# Patient Record
Sex: FEMALE | Race: WHITE | NOT HISPANIC OR LATINO | Employment: FULL TIME | URBAN - METROPOLITAN AREA
[De-identification: names, ages, dates, MRNs, and addresses within clinical notes are randomized per-mention and may not be internally consistent; named-entity substitution may affect disease eponyms.]

---

## 2023-09-23 PROBLEM — M20.12 HALLUX VALGUS WITH BUNIONS OF LEFT FOOT: Status: ACTIVE | Noted: 2023-09-23

## 2023-09-23 PROBLEM — R92.30 DENSE BREASTS: Status: ACTIVE | Noted: 2023-09-23

## 2023-09-23 PROBLEM — Z78.0 MENOPAUSE: Status: ACTIVE | Noted: 2023-09-23

## 2023-09-23 PROBLEM — M17.0 PRIMARY LOCALIZED OSTEOARTHRITIS OF BOTH KNEES: Status: ACTIVE | Noted: 2023-09-23

## 2023-09-23 PROBLEM — D22.9 MULTIPLE NEVI: Status: ACTIVE | Noted: 2023-09-23

## 2023-09-23 PROBLEM — R92.2 DENSE BREASTS: Status: ACTIVE | Noted: 2023-09-23

## 2023-09-23 PROBLEM — M25.551 HIP PAIN, BILATERAL: Status: ACTIVE | Noted: 2023-09-23

## 2023-09-23 PROBLEM — R00.1 SINUS BRADYCARDIA: Status: ACTIVE | Noted: 2023-09-23

## 2023-09-23 PROBLEM — N76.0 VAGINITIS: Status: ACTIVE | Noted: 2023-09-23

## 2023-09-23 PROBLEM — M17.11 PATELLOFEMORAL ARTHRITIS OF RIGHT KNEE: Status: ACTIVE | Noted: 2023-09-23

## 2023-09-23 PROBLEM — E55.9 VITAMIN D DEFICIENCY: Status: ACTIVE | Noted: 2023-09-23

## 2023-09-23 PROBLEM — A60.00 GENITAL HERPES: Status: ACTIVE | Noted: 2023-09-23

## 2023-09-23 PROBLEM — J40 TRACHEOBRONCHITIS: Status: ACTIVE | Noted: 2023-09-23

## 2023-09-23 PROBLEM — R10.13 EPIGASTRIC BURNING SENSATION: Status: ACTIVE | Noted: 2023-09-23

## 2023-09-23 PROBLEM — E78.89 ELEVATED HDL: Status: ACTIVE | Noted: 2023-09-23

## 2023-09-23 PROBLEM — R92.0 ABNORMAL MAMMOGRAM WITH MICROCALCIFICATION: Status: ACTIVE | Noted: 2023-09-23

## 2023-09-23 PROBLEM — M25.552 HIP PAIN, BILATERAL: Status: ACTIVE | Noted: 2023-09-23

## 2023-09-23 PROBLEM — R39.9 UTI SYMPTOMS: Status: ACTIVE | Noted: 2023-09-23

## 2023-09-23 PROBLEM — M21.612 HALLUX VALGUS WITH BUNIONS OF LEFT FOOT: Status: ACTIVE | Noted: 2023-09-23

## 2023-09-23 PROBLEM — N60.19 FIBROCYSTIC BREAST CHANGES: Status: ACTIVE | Noted: 2023-09-23

## 2023-09-23 PROBLEM — M85.80 OSTEOPENIA: Status: ACTIVE | Noted: 2023-09-23

## 2023-09-23 PROBLEM — M22.41 CHONDROMALACIA OF PATELLA, RIGHT: Status: ACTIVE | Noted: 2023-09-23

## 2023-09-23 PROBLEM — G89.29 CHRONIC PAIN OF RIGHT KNEE: Status: ACTIVE | Noted: 2023-09-23

## 2023-09-23 PROBLEM — E03.8 SUBCLINICAL HYPOTHYROIDISM: Status: ACTIVE | Noted: 2023-09-23

## 2023-09-23 PROBLEM — M25.561 CHRONIC PAIN OF RIGHT KNEE: Status: ACTIVE | Noted: 2023-09-23

## 2023-09-23 PROBLEM — K21.9 GASTROESOPHAGEAL REFLUX DISEASE: Status: ACTIVE | Noted: 2023-09-23

## 2023-09-23 PROBLEM — M25.50 ARTHRALGIA: Status: ACTIVE | Noted: 2023-09-23

## 2023-09-23 PROBLEM — M47.816 LUMBAR SPONDYLOSIS: Status: ACTIVE | Noted: 2023-09-23

## 2023-09-23 RX ORDER — CHOLECALCIFEROL (VITAMIN D3) 125 MCG
1 TABLET ORAL DAILY
COMMUNITY
Start: 2016-03-17 | End: 2023-12-06 | Stop reason: ALTCHOICE

## 2023-09-23 RX ORDER — PANTOPRAZOLE SODIUM 40 MG/1
1 TABLET, DELAYED RELEASE ORAL DAILY
COMMUNITY
Start: 2022-09-27 | End: 2023-12-06 | Stop reason: ALTCHOICE

## 2023-09-23 RX ORDER — RALOXIFENE HYDROCHLORIDE 60 MG/1
1 TABLET, FILM COATED ORAL DAILY
COMMUNITY
Start: 2022-01-21 | End: 2023-12-06 | Stop reason: ALTCHOICE

## 2023-09-23 RX ORDER — PANTOPRAZOLE SODIUM 20 MG/1
20 TABLET, DELAYED RELEASE ORAL
COMMUNITY
Start: 2023-01-25 | End: 2023-12-06 | Stop reason: ALTCHOICE

## 2023-09-23 RX ORDER — IBUPROFEN 200 MG
2 TABLET ORAL DAILY
COMMUNITY
Start: 2015-02-03

## 2023-10-31 ENCOUNTER — APPOINTMENT (OUTPATIENT)
Dept: ORTHOPEDIC SURGERY | Facility: CLINIC | Age: 63
End: 2023-10-31

## 2023-12-06 ENCOUNTER — OFFICE VISIT (OUTPATIENT)
Dept: OBSTETRICS AND GYNECOLOGY | Facility: CLINIC | Age: 63
End: 2023-12-06
Payer: COMMERCIAL

## 2023-12-06 VITALS
BODY MASS INDEX: 24.94 KG/M2 | HEIGHT: 60 IN | DIASTOLIC BLOOD PRESSURE: 60 MMHG | WEIGHT: 127 LBS | SYSTOLIC BLOOD PRESSURE: 118 MMHG

## 2023-12-06 DIAGNOSIS — Z12.39 SCREENING FOR BREAST CANCER USING NON-MAMMOGRAM MODALITY: Primary | ICD-10-CM

## 2023-12-06 DIAGNOSIS — F41.9 ANXIETY DUE TO INVASIVE PROCEDURE: ICD-10-CM

## 2023-12-06 DIAGNOSIS — Z01.419 WELL WOMAN EXAM: Primary | ICD-10-CM

## 2023-12-06 PROCEDURE — 99396 PREV VISIT EST AGE 40-64: CPT | Performed by: OBSTETRICS & GYNECOLOGY

## 2023-12-06 RX ORDER — LORAZEPAM 1 MG/1
1 TABLET ORAL ONCE
Status: DISCONTINUED | OUTPATIENT
Start: 2023-12-06 | End: 2024-01-27

## 2023-12-06 ASSESSMENT — ENCOUNTER SYMPTOMS
CARDIOVASCULAR NEGATIVE: 1
ENDOCRINE NEGATIVE: 1
EYES NEGATIVE: 1
HEMATOLOGIC/LYMPHATIC NEGATIVE: 1
MUSCULOSKELETAL NEGATIVE: 1
CONSTITUTIONAL NEGATIVE: 1
PSYCHIATRIC NEGATIVE: 1
RESPIRATORY NEGATIVE: 1
NEUROLOGICAL NEGATIVE: 1
GASTROINTESTINAL NEGATIVE: 1
ALLERGIC/IMMUNOLOGIC NEGATIVE: 1

## 2023-12-06 NOTE — ASSESSMENT & PLAN NOTE
Pap is due in 2026.  Discussed breast cancer screening and mammograms and MRI. Would like to try MRI again with something to help her relax.

## 2023-12-06 NOTE — PROGRESS NOTES
Subjective   Patient ID: Lashell Perkins is a 63 y.o. female who presents for Annual Exam (LLOYD//LAST PAP:  12/01/22- WNL -HPV/LAST MAMM:  02/2023//Chaperone Declined, Sharifa Solomon, MAII/).  Aching joints, especially her knees. May need replacement.  Exercise, yes.  Unsure about FAST MRI.        Review of Systems   Constitutional: Negative.    HENT: Negative.     Eyes: Negative.    Respiratory: Negative.     Cardiovascular: Negative.    Gastrointestinal: Negative.    Endocrine: Negative.    Genitourinary: Negative.    Musculoskeletal: Negative.    Skin: Negative.    Allergic/Immunologic: Negative.    Neurological: Negative.    Hematological: Negative.    Psychiatric/Behavioral: Negative.         Objective   Physical Exam  Constitutional:       Appearance: Normal appearance. She is normal weight.   HENT:      Head: Normocephalic.   Neck:      Thyroid: No thyroid mass or thyromegaly.   Pulmonary:      Effort: Pulmonary effort is normal.   Chest:   Breasts:     Right: Normal.      Left: Normal.   Abdominal:      Palpations: Abdomen is soft.   Genitourinary:     General: Normal vulva.      Exam position: Lithotomy position.      Cervix: Normal.      Uterus: Normal.       Adnexa: Right adnexa normal and left adnexa normal.   Musculoskeletal:         General: Normal range of motion.      Cervical back: Normal range of motion and neck supple.   Lymphadenopathy:      Upper Body:      Right upper body: No supraclavicular or axillary adenopathy.      Left upper body: No supraclavicular or axillary adenopathy.   Skin:     General: Skin is warm and dry.   Neurological:      General: No focal deficit present.      Mental Status: She is alert.   Psychiatric:         Mood and Affect: Mood normal.         Behavior: Behavior normal.         Thought Content: Thought content normal.         Judgment: Judgment normal.         Assessment/Plan   Problem List Items Addressed This Visit             ICD-10-CM       Genitourinary and  Reproductive    Well woman exam - Primary Z01.419     Pap is due in 2026.  Discussed breast cancer screening and mammograms and MRI. Would like to try MRI again with something to help her relax.                 Pari Medina MD 12/06/23 9:07 AM

## 2024-01-03 ENCOUNTER — TELEPHONE (OUTPATIENT)
Dept: PRIMARY CARE | Facility: CLINIC | Age: 64
End: 2024-01-03
Payer: COMMERCIAL

## 2024-01-03 DIAGNOSIS — M85.80 OSTEOPENIA, UNSPECIFIED LOCATION: ICD-10-CM

## 2024-01-03 DIAGNOSIS — R00.1 SINUS BRADYCARDIA: ICD-10-CM

## 2024-01-03 DIAGNOSIS — Z11.59 ENCOUNTER FOR HEPATITIS C SCREENING TEST FOR LOW RISK PATIENT: ICD-10-CM

## 2024-01-03 DIAGNOSIS — E03.8 SUBCLINICAL HYPOTHYROIDISM: Primary | ICD-10-CM

## 2024-01-03 DIAGNOSIS — K21.9 GASTROESOPHAGEAL REFLUX DISEASE WITHOUT ESOPHAGITIS: ICD-10-CM

## 2024-01-03 ASSESSMENT — PROMIS GLOBAL HEALTH SCALE
RATE_MENTAL_HEALTH: VERY GOOD
CARRYOUT_SOCIAL_ACTIVITIES: VERY GOOD
RATE_PHYSICAL_HEALTH: VERY GOOD
RATE_AVERAGE_PAIN: 5
CARRYOUT_PHYSICAL_ACTIVITIES: COMPLETELY
EMOTIONAL_PROBLEMS: RARELY
RATE_SOCIAL_SATISFACTION: VERY GOOD
RATE_QUALITY_OF_LIFE: VERY GOOD
RATE_AVERAGE_FATIGUE: MILD
RATE_GENERAL_HEALTH: VERY GOOD

## 2024-01-06 ENCOUNTER — LAB (OUTPATIENT)
Dept: LAB | Facility: LAB | Age: 64
End: 2024-01-06
Payer: COMMERCIAL

## 2024-01-06 DIAGNOSIS — K21.9 GASTROESOPHAGEAL REFLUX DISEASE WITHOUT ESOPHAGITIS: ICD-10-CM

## 2024-01-06 DIAGNOSIS — Z11.59 ENCOUNTER FOR HEPATITIS C SCREENING TEST FOR LOW RISK PATIENT: ICD-10-CM

## 2024-01-06 DIAGNOSIS — E03.8 SUBCLINICAL HYPOTHYROIDISM: ICD-10-CM

## 2024-01-06 LAB
ALBUMIN SERPL BCP-MCNC: 4.5 G/DL (ref 3.4–5)
ALT SERPL W P-5'-P-CCNC: 15 U/L (ref 7–45)
ANION GAP SERPL CALC-SCNC: 13 MMOL/L (ref 10–20)
APPEARANCE UR: CLEAR
AST SERPL W P-5'-P-CCNC: 16 U/L (ref 9–39)
BASOPHILS # BLD AUTO: 0.03 X10*3/UL (ref 0–0.1)
BASOPHILS NFR BLD AUTO: 0.6 %
BILIRUB UR STRIP.AUTO-MCNC: NEGATIVE MG/DL
BUN SERPL-MCNC: 20 MG/DL (ref 6–23)
CALCIUM SERPL-MCNC: 9.9 MG/DL (ref 8.6–10.6)
CHLORIDE SERPL-SCNC: 101 MMOL/L (ref 98–107)
CHOLEST SERPL-MCNC: 261 MG/DL (ref 0–199)
CHOLESTEROL/HDL RATIO: 3.2
CO2 SERPL-SCNC: 29 MMOL/L (ref 21–32)
COLOR UR: YELLOW
CREAT SERPL-MCNC: 0.84 MG/DL (ref 0.5–1.05)
EOSINOPHIL # BLD AUTO: 0.06 X10*3/UL (ref 0–0.7)
EOSINOPHIL NFR BLD AUTO: 1.2 %
ERYTHROCYTE [DISTWIDTH] IN BLOOD BY AUTOMATED COUNT: 12 % (ref 11.5–14.5)
GFR SERPL CREATININE-BSD FRML MDRD: 79 ML/MIN/1.73M*2
GLUCOSE SERPL-MCNC: 98 MG/DL (ref 74–99)
GLUCOSE UR STRIP.AUTO-MCNC: NEGATIVE MG/DL
HCT VFR BLD AUTO: 45.3 % (ref 36–46)
HCV AB SER QL: NONREACTIVE
HDLC SERPL-MCNC: 82.5 MG/DL
HGB BLD-MCNC: 14.7 G/DL (ref 12–16)
IMM GRANULOCYTES # BLD AUTO: 0.02 X10*3/UL (ref 0–0.7)
IMM GRANULOCYTES NFR BLD AUTO: 0.4 % (ref 0–0.9)
KETONES UR STRIP.AUTO-MCNC: NEGATIVE MG/DL
LDLC SERPL CALC-MCNC: 167 MG/DL
LEUKOCYTE ESTERASE UR QL STRIP.AUTO: NEGATIVE
LYMPHOCYTES # BLD AUTO: 1.44 X10*3/UL (ref 1.2–4.8)
LYMPHOCYTES NFR BLD AUTO: 28.6 %
MCH RBC QN AUTO: 31.2 PG (ref 26–34)
MCHC RBC AUTO-ENTMCNC: 32.5 G/DL (ref 32–36)
MCV RBC AUTO: 96 FL (ref 80–100)
MONOCYTES # BLD AUTO: 0.33 X10*3/UL (ref 0.1–1)
MONOCYTES NFR BLD AUTO: 6.6 %
NEUTROPHILS # BLD AUTO: 3.15 X10*3/UL (ref 1.2–7.7)
NEUTROPHILS NFR BLD AUTO: 62.6 %
NITRITE UR QL STRIP.AUTO: NEGATIVE
NON HDL CHOLESTEROL: 179 MG/DL (ref 0–149)
NRBC BLD-RTO: 0 /100 WBCS (ref 0–0)
PH UR STRIP.AUTO: 7 [PH]
PHOSPHATE SERPL-MCNC: 4.4 MG/DL (ref 2.5–4.9)
PLATELET # BLD AUTO: 215 X10*3/UL (ref 150–450)
POTASSIUM SERPL-SCNC: 4.4 MMOL/L (ref 3.5–5.3)
PROT UR STRIP.AUTO-MCNC: NEGATIVE MG/DL
RBC # BLD AUTO: 4.71 X10*6/UL (ref 4–5.2)
RBC # UR STRIP.AUTO: NEGATIVE /UL
SODIUM SERPL-SCNC: 139 MMOL/L (ref 136–145)
SP GR UR STRIP.AUTO: 1.01
TRIGL SERPL-MCNC: 60 MG/DL (ref 0–149)
TSH SERPL-ACNC: 2.61 MIU/L (ref 0.44–3.98)
UROBILINOGEN UR STRIP.AUTO-MCNC: <2 MG/DL
VLDL: 12 MG/DL (ref 0–40)
WBC # BLD AUTO: 5 X10*3/UL (ref 4.4–11.3)

## 2024-01-06 PROCEDURE — 80061 LIPID PANEL: CPT

## 2024-01-06 PROCEDURE — 81003 URINALYSIS AUTO W/O SCOPE: CPT

## 2024-01-06 PROCEDURE — 84450 TRANSFERASE (AST) (SGOT): CPT

## 2024-01-06 PROCEDURE — 80069 RENAL FUNCTION PANEL: CPT

## 2024-01-06 PROCEDURE — 85025 COMPLETE CBC W/AUTO DIFF WBC: CPT

## 2024-01-06 PROCEDURE — 84443 ASSAY THYROID STIM HORMONE: CPT

## 2024-01-06 PROCEDURE — 84460 ALANINE AMINO (ALT) (SGPT): CPT

## 2024-01-06 PROCEDURE — 86803 HEPATITIS C AB TEST: CPT

## 2024-01-06 PROCEDURE — 36415 COLL VENOUS BLD VENIPUNCTURE: CPT

## 2024-01-10 ENCOUNTER — OFFICE VISIT (OUTPATIENT)
Dept: PRIMARY CARE | Facility: CLINIC | Age: 64
End: 2024-01-10
Payer: COMMERCIAL

## 2024-01-10 VITALS
HEART RATE: 60 BPM | RESPIRATION RATE: 14 BRPM | WEIGHT: 127 LBS | HEIGHT: 60 IN | BODY MASS INDEX: 24.94 KG/M2 | SYSTOLIC BLOOD PRESSURE: 120 MMHG | DIASTOLIC BLOOD PRESSURE: 70 MMHG

## 2024-01-10 DIAGNOSIS — R00.1 SINUS BRADYCARDIA: ICD-10-CM

## 2024-01-10 DIAGNOSIS — E78.5 HYPERLIPIDEMIA, UNSPECIFIED HYPERLIPIDEMIA TYPE: ICD-10-CM

## 2024-01-10 DIAGNOSIS — E78.89 ELEVATED HDL: ICD-10-CM

## 2024-01-10 DIAGNOSIS — Z12.11 COLON CANCER SCREENING: ICD-10-CM

## 2024-01-10 DIAGNOSIS — J31.0 RHINITIS, UNSPECIFIED TYPE: ICD-10-CM

## 2024-01-10 DIAGNOSIS — M17.11 PATELLOFEMORAL ARTHRITIS OF RIGHT KNEE: ICD-10-CM

## 2024-01-10 DIAGNOSIS — Z00.00 ROUTINE GENERAL MEDICAL EXAMINATION AT A HEALTH CARE FACILITY: Primary | ICD-10-CM

## 2024-01-10 PROCEDURE — 99214 OFFICE O/P EST MOD 30 MIN: CPT | Performed by: INTERNAL MEDICINE

## 2024-01-10 PROCEDURE — 93000 ELECTROCARDIOGRAM COMPLETE: CPT | Performed by: INTERNAL MEDICINE

## 2024-01-10 PROCEDURE — 1036F TOBACCO NON-USER: CPT | Performed by: INTERNAL MEDICINE

## 2024-01-10 PROCEDURE — 90471 IMMUNIZATION ADMIN: CPT | Performed by: INTERNAL MEDICINE

## 2024-01-10 PROCEDURE — 99396 PREV VISIT EST AGE 40-64: CPT | Performed by: INTERNAL MEDICINE

## 2024-01-10 PROCEDURE — 90715 TDAP VACCINE 7 YRS/> IM: CPT | Performed by: INTERNAL MEDICINE

## 2024-01-10 RX ORDER — TRIAMCINOLONE ACETONIDE 55 UG/1
2 SPRAY, METERED NASAL DAILY
Qty: 16.5 G | Refills: 11 | Status: SHIPPED | OUTPATIENT
Start: 2024-01-10 | End: 2025-01-09

## 2024-01-10 NOTE — PATIENT INSTRUCTIONS
Overall you are in good health today  You have no clinical evidence of heart disease, and cardiac risk factors are satisfactory with emphasis on healthy good HDL cholesterol  Recommend repeat coronary artery calcium CT score to assess for any change since 2018  Age and gender appropriate cancer screening and prevention will be up-to-date with a colon check  Recommend Cologuard  Continue breast surveillance  Immunizations updated with tetanus diphtheria pertussis, good for 10 years; consider RSV vaccine  Rhinitis, nocturnal runny nose-May continue antihistamine for short-term relief, trial topical cortisone nasal spray for 2 to 3 weeks such as Nasacort and assess for clearing

## 2024-01-27 PROBLEM — Z00.00 ROUTINE GENERAL MEDICAL EXAMINATION AT A HEALTH CARE FACILITY: Status: ACTIVE | Noted: 2024-01-27

## 2024-01-27 PROBLEM — A60.00 GENITAL HERPES: Status: RESOLVED | Noted: 2023-09-23 | Resolved: 2024-01-27

## 2024-01-27 PROBLEM — Z12.11 COLON CANCER SCREENING: Status: ACTIVE | Noted: 2024-01-27

## 2024-01-27 PROBLEM — J31.0 RHINITIS: Status: ACTIVE | Noted: 2024-01-27

## 2024-01-27 PROBLEM — E78.5 HYPERLIPIDEMIA: Status: ACTIVE | Noted: 2024-01-27

## 2024-01-27 NOTE — PROGRESS NOTES
Subjective   Patient ID: Lashell Perkins is a 63 y.o. female who presents for Annual Exam.  HPI  Chronic postnasal drip, Zyrtec, nonseasonal  Knee pain brace Advil exercise worse on stairs, hands ache  Had flu and COVID-19 vaccines  Review of Systems  Constitutional: 122 #, but not feeling poorly, not feeling tired, no fever, no recent weight gain and no recent weight loss.   Eyes: exam UTD  glasses+ /contacts - 2021, but no blurred vision, no diplopia and no eyesight problems.   ENT: dental UTD+, but no hearing loss, no tinnitus, no earache, no sore throat, no hoarseness and no swollen glands in the neck.   Cardiovascular: no chest pain, no tightness or heavy pressure, no shortness of breath, no palpitations and no lower extremity edema.   Respiratory: no cough, not coughing up sputum and no wheezing that is consistent with asthma.   Gastrointestinal: colonoscope Dec 2014, but no change in bowel habits, no diarrhea, no constipation, no bloody stools, no nausea, no vomiting, no abdominal pain, no signs and symptoms of ulcer disease, no kingsley colored stools and no intolerance to fatty foods.   Genitourinary: no urinary frequency, no dysuria, no burning sensation during urination and no hematuria.   Musculoskeletal: arthralgias, joint stiffness and knees not limiting, but as noted in HPI, no myalgias, no joint swelling, no muscle weakness, no back pain, no limb pain, no limb swelling and no difficulty walking.   Skin: no rashes, no change in skin color and pigmentation, no skin lesions and no skin lumps.   Neurological: no headaches, no dizziness, no seizures, no tingling, no numbness, no signs and symptoms of stroke and no limb weakness.   Psychiatric: no confusion, no memory lapses or loss, no depression, no anxiety and no sleep disturbances.   Endocrine: no goiter, no thyroid disorder, no diabetes mellitus, no excessive thirst, no dry skin, no cold intolerance, no heat intolerance and no increased urinary frequency.    Hematologic/Lymphatic: is not slow to heal, does not bleed easily, does not bruise easily, no thrombophlebitis, no anemia and no history of blood transfusion.      Objective   Physical Exam  /70   Pulse 60   Resp 14   Ht 1.524 m (5')   Wt 57.6 kg (127 lb)   BMI 24.80 kg/m²     General: well-developed, well-nourished middle-aged ambulatory white female in no acute distress  Head: normocephalic/atraumatic; temporal arteries intact, nontender; Temporalmandibular joints nontender without click  Eyes: EOMI, PERRLA, sclera white, conjunctiva pink, fundi not examined  Ears: Canals cerumen, TMs clear intact with normal landmarks, hearing intact to speech and finger rub  Nose: Nasal mucosa clear, pink, moist; no sinus tenderness  Oropharynx: Mucosa clear, pink, moist without lesions or exudate   Dentition intact good/ fair condition/repair   Tonsils atrophy/absent   Tongue midline, normal mucosa and protrusion   Normal motion of palate/uvula/pharynx  Neck: Supple, full range of motion; no lymphadenopathy; no JVD   Thyroid normal size, smooth to palpation/observation without nodules   Carotids normal pulse, upstroke without bruit  Back: Spine normal shape, curvature, flexibility, nontender   No CVA or SIJ tenderness  Chest: Normal symmetrical, full expansion with equal breath sounds without adventitious sounds; wall nontender  Breasts: Not examined  Cor: PMI normal; regular rate and rhythm; normal S1, S2 without adventitious sounds  Abdomen: Soft, nontender, benign without mass, hepatosplenomegaly, fluid   Bowels sounds present, normal  Pelvic: Not examined  Rectal: Not examined  Extremities: Bones, Joints intact with FROM, T0 L0 S0; mild crepitus bilateral knees right greater than left   Pulses intact, symmetrical; no edema; venous system legs normal  Neurological: Mental status - alert, appropriate affect, normal orientation and conversational interaction; RIGHT handed dominant   Cranial Nerves: II-XII  intact   Motor - 5/5 strength throughout muscle groups; normal tone, bulk   Sensory - intact symmetrical soft touch, sharp, sense   DTRs - intact knee, ankle, brachial   Cerebellar - normal finger-nose; absent Rhomberg sign   Station/gait - intact, stable including toe, heel, and tandem   Integument: Skin - clear without rash or lesion   Lymph - without cervical, axillary, or inguinal lymphadenopathy       Falls -    Results/Data  Orthopedics consult 4/4/2023  GYN consult 12/6/2023  Ophthalmology consult 1/8/2024    Laboratory  1/6/24  HDL c 82, R 3.2 LDLC 167  February 2018 satisfactory with healthy vitamin D and normal TSH  Electrocardiogram sinus bradycardia rate 56  Esophagram 2/6/23 -normal  colonoscopy January 2014  Mammogram January 2021 2/13/23  Knee x-ray March 2018 4/4/23  Bone densitometry June 2016 2021 stable, near normal  Chest x-ray May 2018  CT cardiac score July 2018 equals 46     Assessment/Plan   Overall you are in good health today  You have no clinical evidence of heart disease, and cardiac risk factors are satisfactory with emphasis on healthy good HDL cholesterol  Recommend repeat coronary artery calcium CT score to assess for any change since 2018  Age and gender appropriate cancer screening and prevention will be up-to-date with a colon check  Recommend Cologuard  Continue breast surveillance  Immunizations updated with tetanus diphtheria pertussis, good for 10 years; consider RSV vaccine  Rhinitis, nocturnal runny nose-May continue antihistamine for short-term relief, trial topical cortisone nasal spray for 2 to 3 weeks such as Nasacort and assess for clearing  Problem List Items Addressed This Visit       Elevated HDL    Relevant Orders    ECG 12 lead (Clinic Performed) (Completed)    Patellofemoral arthritis of right knee    Sinus bradycardia    Relevant Orders    ECG 12 lead (Clinic Performed) (Completed)    Routine general medical examination at a health care facility - Primary     Colon cancer screening    Relevant Orders    Cologuard® colon cancer screening    Rhinitis    Relevant Medications    triamcinolone (Nasacort) 55 mcg nasal inhaler    Hyperlipidemia    Relevant Orders    ECG 12 lead (Clinic Performed) (Completed)    CT cardiac scoring wo IV contrast

## 2024-01-30 LAB — NONINV COLON CA DNA+OCC BLD SCRN STL QL: NEGATIVE

## 2024-02-13 PROBLEM — R92.30 DENSE BREAST TISSUE: Status: ACTIVE | Noted: 2024-02-13

## 2024-02-13 PROBLEM — Z13.71 BRCA NEGATIVE: Status: ACTIVE | Noted: 2024-02-13

## 2024-02-13 PROBLEM — Z12.39 BREAST CANCER SCREENING, HIGH RISK PATIENT: Status: ACTIVE | Noted: 2024-02-13
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
## 2024-02-15 ENCOUNTER — HOSPITAL ENCOUNTER (OUTPATIENT)
Dept: RADIOLOGY | Facility: HOSPITAL | Age: 64
Discharge: HOME | End: 2024-02-15
Payer: COMMERCIAL

## 2024-02-15 ENCOUNTER — OFFICE VISIT (OUTPATIENT)
Dept: SURGICAL ONCOLOGY | Facility: HOSPITAL | Age: 64
End: 2024-02-15
Payer: COMMERCIAL

## 2024-02-15 VITALS
TEMPERATURE: 97 F | WEIGHT: 127.4 LBS | SYSTOLIC BLOOD PRESSURE: 126 MMHG | DIASTOLIC BLOOD PRESSURE: 80 MMHG | BODY MASS INDEX: 24.88 KG/M2 | HEART RATE: 53 BPM

## 2024-02-15 DIAGNOSIS — Z12.31 SCREENING MAMMOGRAM FOR BREAST CANCER: ICD-10-CM

## 2024-02-15 DIAGNOSIS — Z13.71 BRCA NEGATIVE: ICD-10-CM

## 2024-02-15 DIAGNOSIS — Z12.39 BREAST CANCER SCREENING, HIGH RISK PATIENT: Primary | ICD-10-CM

## 2024-02-15 DIAGNOSIS — R92.30 DENSE BREAST TISSUE: ICD-10-CM

## 2024-02-15 DIAGNOSIS — Z12.31 BREAST CANCER SCREENING BY MAMMOGRAM: ICD-10-CM

## 2024-02-15 PROCEDURE — 99214 OFFICE O/P EST MOD 30 MIN: CPT | Performed by: NURSE PRACTITIONER

## 2024-02-15 PROCEDURE — 1036F TOBACCO NON-USER: CPT | Performed by: NURSE PRACTITIONER

## 2024-02-15 PROCEDURE — 77067 SCR MAMMO BI INCL CAD: CPT

## 2024-02-15 PROCEDURE — 77063 BREAST TOMOSYNTHESIS BI: CPT | Mod: BILATERAL PROCEDURE | Performed by: RADIOLOGY

## 2024-02-15 PROCEDURE — 77067 SCR MAMMO BI INCL CAD: CPT | Mod: BILATERAL PROCEDURE | Performed by: RADIOLOGY

## 2024-02-15 ASSESSMENT — PAIN SCALES - GENERAL: PAINLEVEL: 0-NO PAIN

## 2024-03-19 DIAGNOSIS — M25.551 ACUTE HIP PAIN, RIGHT: Primary | ICD-10-CM

## 2024-03-19 DIAGNOSIS — M25.551 RIGHT HIP PAIN: ICD-10-CM

## 2024-03-21 ENCOUNTER — HOSPITAL ENCOUNTER (OUTPATIENT)
Dept: RADIOLOGY | Facility: CLINIC | Age: 64
Discharge: HOME | End: 2024-03-21
Payer: COMMERCIAL

## 2024-03-21 DIAGNOSIS — M25.551 RIGHT HIP PAIN: ICD-10-CM

## 2024-03-21 PROCEDURE — 73502 X-RAY EXAM HIP UNI 2-3 VIEWS: CPT | Mod: RIGHT SIDE | Performed by: RADIOLOGY

## 2024-03-21 PROCEDURE — 73502 X-RAY EXAM HIP UNI 2-3 VIEWS: CPT | Mod: RT

## 2024-03-22 ENCOUNTER — OFFICE VISIT (OUTPATIENT)
Dept: ORTHOPEDIC SURGERY | Facility: CLINIC | Age: 64
End: 2024-03-22
Payer: COMMERCIAL

## 2024-03-22 ENCOUNTER — APPOINTMENT (OUTPATIENT)
Dept: RADIOLOGY | Facility: CLINIC | Age: 64
End: 2024-03-22
Payer: COMMERCIAL

## 2024-03-22 VITALS — WEIGHT: 127 LBS | HEIGHT: 60 IN | BODY MASS INDEX: 24.94 KG/M2

## 2024-03-22 DIAGNOSIS — M16.11 ARTHRITIS OF RIGHT HIP: Primary | ICD-10-CM

## 2024-03-22 DIAGNOSIS — M25.551 RIGHT HIP PAIN: ICD-10-CM

## 2024-03-22 PROCEDURE — 1036F TOBACCO NON-USER: CPT | Performed by: PHYSICIAN ASSISTANT

## 2024-03-22 PROCEDURE — 99204 OFFICE O/P NEW MOD 45 MIN: CPT | Performed by: PHYSICIAN ASSISTANT

## 2024-03-22 RX ORDER — MELOXICAM 15 MG/1
15 TABLET ORAL DAILY
Qty: 30 TABLET | Refills: 11 | Status: SHIPPED | OUTPATIENT
Start: 2024-03-22 | End: 2025-03-22
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
## 2024-03-26 ENCOUNTER — HOSPITAL ENCOUNTER (OUTPATIENT)
Dept: RADIOLOGY | Facility: HOSPITAL | Age: 64
Discharge: HOME | End: 2024-03-26
Payer: COMMERCIAL

## 2024-03-26 DIAGNOSIS — M25.551 RIGHT HIP PAIN: ICD-10-CM

## 2024-03-26 PROCEDURE — 77002 NEEDLE LOCALIZATION BY XRAY: CPT | Mod: RIGHT SIDE | Performed by: INTERNAL MEDICINE

## 2024-03-26 PROCEDURE — 2500000004 HC RX 250 GENERAL PHARMACY W/ HCPCS (ALT 636 FOR OP/ED): Performed by: PHYSICIAN ASSISTANT

## 2024-03-26 PROCEDURE — 20610 DRAIN/INJ JOINT/BURSA W/O US: CPT | Mod: RIGHT SIDE | Performed by: INTERNAL MEDICINE

## 2024-03-26 PROCEDURE — 77002 NEEDLE LOCALIZATION BY XRAY: CPT | Mod: RT

## 2024-03-26 PROCEDURE — 2550000001 HC RX 255 CONTRASTS: Performed by: PHYSICIAN ASSISTANT

## 2024-03-26 RX ORDER — BUPIVACAINE HCL/EPINEPHRINE 0.5-1:200K
3 VIAL (ML) INJECTION ONCE
Status: DISCONTINUED | OUTPATIENT
Start: 2024-03-26 | End: 2024-03-27 | Stop reason: HOSPADM

## 2024-03-26 RX ORDER — TRIAMCINOLONE ACETONIDE 40 MG/ML
1 INJECTION, SUSPENSION INTRA-ARTICULAR; INTRAMUSCULAR ONCE
Status: COMPLETED | OUTPATIENT
Start: 2024-03-26 | End: 2024-03-26

## 2024-03-26 RX ORDER — LIDOCAINE HYDROCHLORIDE 10 MG/ML
5 INJECTION, SOLUTION EPIDURAL; INFILTRATION; INTRACAUDAL; PERINEURAL ONCE
Status: DISCONTINUED | OUTPATIENT
Start: 2024-03-26 | End: 2024-03-27 | Stop reason: HOSPADM

## 2024-03-26 RX ADMIN — TRIAMCINOLONE ACETONIDE 1 MG: 40 INJECTION, SUSPENSION INTRA-ARTICULAR; INTRAMUSCULAR at 13:45

## 2024-03-26 RX ADMIN — IOHEXOL 3 ML: 240 INJECTION, SOLUTION INTRATHECAL; INTRAVASCULAR; INTRAVENOUS; ORAL at 13:25

## 2024-03-26 NOTE — POST-PROCEDURE NOTE
Interventional Radiology Brief Postprocedure Note    Attending: Sergio Villanueva MD    Assistant: Francis Blaes MD    Diagnosis: Right hip pain    Description of procedure:   Technically successful fluoroscopic guided right hip joint injection of bupivacaine and Kenalog was performed. Please see PACS report for full procedural details.     Anesthesia:  Local    Complications: None    Estimated Blood Loss: minimal    Medications (Filter: Administrations occurring from 1413 to 1413 on 03/26/24) As of 03/26/24 1413      None          No specimens collected      See detailed result report with images in PACS.    The patient tolerated the procedure well without incident or complication and is in stable condition.      Shawn, Brittany

## 2024-04-02 ENCOUNTER — APPOINTMENT (OUTPATIENT)
Dept: ORTHOPEDIC SURGERY | Facility: CLINIC | Age: 64
End: 2024-04-02
Payer: COMMERCIAL

## 2024-04-09 ENCOUNTER — APPOINTMENT (OUTPATIENT)
Dept: ORTHOPEDIC SURGERY | Facility: CLINIC | Age: 64
End: 2024-04-09
Payer: COMMERCIAL

## 2024-04-30 ENCOUNTER — HOSPITAL ENCOUNTER (OUTPATIENT)
Dept: RADIOLOGY | Facility: HOSPITAL | Age: 64
Discharge: HOME | End: 2024-04-30
Payer: COMMERCIAL

## 2024-04-30 DIAGNOSIS — E78.5 HYPERLIPIDEMIA, UNSPECIFIED HYPERLIPIDEMIA TYPE: ICD-10-CM

## 2024-04-30 PROCEDURE — 75571 CT HRT W/O DYE W/CA TEST: CPT

## 2024-05-01 ENCOUNTER — HOSPITAL ENCOUNTER (OUTPATIENT)
Dept: RADIOLOGY | Facility: CLINIC | Age: 64
Discharge: HOME | End: 2024-05-01
Payer: COMMERCIAL

## 2024-05-01 ENCOUNTER — OFFICE VISIT (OUTPATIENT)
Dept: ORTHOPEDIC SURGERY | Facility: CLINIC | Age: 64
End: 2024-05-01
Payer: COMMERCIAL

## 2024-05-01 DIAGNOSIS — M15.9 PRIMARY OSTEOARTHRITIS INVOLVING MULTIPLE JOINTS: ICD-10-CM

## 2024-05-01 PROCEDURE — 99214 OFFICE O/P EST MOD 30 MIN: CPT | Performed by: STUDENT IN AN ORGANIZED HEALTH CARE EDUCATION/TRAINING PROGRAM

## 2024-05-01 PROCEDURE — 73562 X-RAY EXAM OF KNEE 3: CPT | Mod: RIGHT SIDE | Performed by: RADIOLOGY

## 2024-05-01 PROCEDURE — 73560 X-RAY EXAM OF KNEE 1 OR 2: CPT | Mod: RIGHT SIDE | Performed by: RADIOLOGY

## 2024-05-01 PROCEDURE — 73560 X-RAY EXAM OF KNEE 1 OR 2: CPT | Mod: LT

## 2024-05-01 PROCEDURE — 1036F TOBACCO NON-USER: CPT | Performed by: STUDENT IN AN ORGANIZED HEALTH CARE EDUCATION/TRAINING PROGRAM

## 2024-05-01 PROCEDURE — 73562 X-RAY EXAM OF KNEE 3: CPT | Mod: RT
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
## 2024-08-12 ENCOUNTER — PATIENT MESSAGE (OUTPATIENT)
Dept: PRIMARY CARE | Facility: CLINIC | Age: 64
End: 2024-08-12

## 2024-12-12 ENCOUNTER — APPOINTMENT (OUTPATIENT)
Dept: OBSTETRICS AND GYNECOLOGY | Facility: CLINIC | Age: 64
End: 2024-12-12
Payer: COMMERCIAL

## 2024-12-12 VITALS
DIASTOLIC BLOOD PRESSURE: 64 MMHG | SYSTOLIC BLOOD PRESSURE: 132 MMHG | HEIGHT: 60 IN | BODY MASS INDEX: 24.35 KG/M2 | WEIGHT: 124 LBS

## 2024-12-12 DIAGNOSIS — Z01.419 WELL WOMAN EXAM: ICD-10-CM

## 2024-12-12 DIAGNOSIS — Z01.818 PREPROCEDURAL EXAMINATION: Primary | ICD-10-CM

## 2024-12-12 PROCEDURE — 99396 PREV VISIT EST AGE 40-64: CPT | Performed by: OBSTETRICS & GYNECOLOGY

## 2024-12-12 PROCEDURE — 3008F BODY MASS INDEX DOCD: CPT | Performed by: OBSTETRICS & GYNECOLOGY

## 2024-12-12 PROCEDURE — 1036F TOBACCO NON-USER: CPT | Performed by: OBSTETRICS & GYNECOLOGY

## 2024-12-12 RX ORDER — DOXYCYCLINE 100 MG/1
1 CAPSULE ORAL
COMMUNITY
Start: 2024-12-04

## 2024-12-12 RX ORDER — LORAZEPAM 1 MG/1
1 TABLET ORAL ONCE
Qty: 1 TABLET | Refills: 0 | Status: SHIPPED | OUTPATIENT
Start: 2024-12-12 | End: 2024-12-12

## 2024-12-12 ASSESSMENT — PAIN SCALES - GENERAL: PAINLEVEL_OUTOF10: 0-NO PAIN

## 2024-12-12 NOTE — PROGRESS NOTES
Lashell Perkins is a 64 y.o. here for well woman visit  HPI: never did the Breast MRI, didn't get the medication. Also, considering ultrasound screening. Seeing breast specialist in February.      DEXA:2021 low bone density  Colonoscopy: cologard 2024  GynHx:   Pap Hx: due 2027  Social History     Substance and Sexual Activity   Sexual Activity Yes    Partners: Male    Birth control/protection: Post-menopausal     Exercise: regular  Past med hx and past surg hx reviewed and notable for: elevated cholesterol    Objective   /64   Ht 1.524 m (5')   Wt 56.2 kg (124 lb)   BMI 24.22 kg/m²   Physical Exam  Constitutional:       Appearance: Normal appearance. She is normal weight.   HENT:      Head: Normocephalic.   Neck:      Thyroid: No thyroid mass or thyromegaly.   Pulmonary:      Effort: Pulmonary effort is normal.   Chest:   Breasts:     Right: Normal.      Left: Normal.   Abdominal:      Palpations: Abdomen is soft.   Genitourinary:     General: Normal vulva.      Exam position: Lithotomy position.      Vagina: Normal.      Cervix: Normal.      Uterus: Normal.       Adnexa: Right adnexa normal and left adnexa normal.   Musculoskeletal:         General: Normal range of motion.      Cervical back: Normal range of motion and neck supple.   Lymphadenopathy:      Upper Body:      Right upper body: No supraclavicular or axillary adenopathy.      Left upper body: No supraclavicular or axillary adenopathy.   Skin:     General: Skin is warm and dry.   Neurological:      General: No focal deficit present.      Mental Status: She is alert.   Psychiatric:         Mood and Affect: Mood normal.         Behavior: Behavior normal.         Thought Content: Thought content normal.         Judgment: Judgment normal.          Assessment and Plan:  Problem List Items Addressed This Visit          Ob-Gyn Problems    Well woman exam    Overview     Pap is due in 2027.  Discussed breast cancer screening and mammograms and MRI. Would  like to try MRI again with something to help her relax.  Dexa is due.          Other Visit Diagnoses       Preprocedural examination    -  Primary    Relevant Medications    LORazepam (Ativan) 1 mg tablet           No orders of the defined types were placed in this encounter.

## 2025-01-09 ASSESSMENT — PROMIS GLOBAL HEALTH SCALE
RATE_PHYSICAL_HEALTH: VERY GOOD
RATE_QUALITY_OF_LIFE: VERY GOOD
RATE_MENTAL_HEALTH: GOOD
RATE_AVERAGE_PAIN: 4
RATE_GENERAL_HEALTH: VERY GOOD
EMOTIONAL_PROBLEMS: RARELY
RATE_AVERAGE_FATIGUE: MILD
CARRYOUT_PHYSICAL_ACTIVITIES: MOSTLY
RATE_SOCIAL_SATISFACTION: VERY GOOD
CARRYOUT_SOCIAL_ACTIVITIES: VERY GOOD

## 2025-01-10 ENCOUNTER — APPOINTMENT (OUTPATIENT)
Dept: PRIMARY CARE | Facility: CLINIC | Age: 65
End: 2025-01-10
Payer: COMMERCIAL

## 2025-01-10 ENCOUNTER — LAB (OUTPATIENT)
Dept: LAB | Facility: LAB | Age: 65
End: 2025-01-10
Payer: COMMERCIAL

## 2025-01-10 VITALS
DIASTOLIC BLOOD PRESSURE: 70 MMHG | SYSTOLIC BLOOD PRESSURE: 120 MMHG | HEIGHT: 60 IN | HEART RATE: 64 BPM | BODY MASS INDEX: 24.35 KG/M2 | RESPIRATION RATE: 14 BRPM | WEIGHT: 124 LBS

## 2025-01-10 DIAGNOSIS — E78.89 ELEVATED HDL: ICD-10-CM

## 2025-01-10 DIAGNOSIS — Z00.00 ROUTINE GENERAL MEDICAL EXAMINATION AT A HEALTH CARE FACILITY: ICD-10-CM

## 2025-01-10 DIAGNOSIS — M17.0 PRIMARY LOCALIZED OSTEOARTHRITIS OF BOTH KNEES: ICD-10-CM

## 2025-01-10 DIAGNOSIS — E78.5 HYPERLIPIDEMIA, UNSPECIFIED HYPERLIPIDEMIA TYPE: ICD-10-CM

## 2025-01-10 DIAGNOSIS — R00.1 SINUS BRADYCARDIA: ICD-10-CM

## 2025-01-10 DIAGNOSIS — E03.8 SUBCLINICAL HYPOTHYROIDISM: ICD-10-CM

## 2025-01-10 DIAGNOSIS — M17.11 PATELLOFEMORAL ARTHRITIS OF RIGHT KNEE: Primary | ICD-10-CM

## 2025-01-10 LAB
ALBUMIN SERPL BCP-MCNC: 4.7 G/DL (ref 3.4–5)
ALT SERPL W P-5'-P-CCNC: 13 U/L (ref 7–45)
ANION GAP SERPL CALC-SCNC: 12 MMOL/L (ref 10–20)
AST SERPL W P-5'-P-CCNC: 19 U/L (ref 9–39)
BASOPHILS # BLD AUTO: 0.03 X10*3/UL (ref 0–0.1)
BASOPHILS NFR BLD AUTO: 0.6 %
BUN SERPL-MCNC: 19 MG/DL (ref 6–23)
CALCIUM SERPL-MCNC: 10.1 MG/DL (ref 8.6–10.6)
CHLORIDE SERPL-SCNC: 104 MMOL/L (ref 98–107)
CHOLEST SERPL-MCNC: 284 MG/DL (ref 0–199)
CHOLESTEROL/HDL RATIO: 3.1
CO2 SERPL-SCNC: 31 MMOL/L (ref 21–32)
CREAT SERPL-MCNC: 0.95 MG/DL (ref 0.5–1.05)
EGFRCR SERPLBLD CKD-EPI 2021: 67 ML/MIN/1.73M*2
EOSINOPHIL # BLD AUTO: 0.06 X10*3/UL (ref 0–0.7)
EOSINOPHIL NFR BLD AUTO: 1.1 %
ERYTHROCYTE [DISTWIDTH] IN BLOOD BY AUTOMATED COUNT: 11.8 % (ref 11.5–14.5)
GLUCOSE SERPL-MCNC: 88 MG/DL (ref 74–99)
HCT VFR BLD AUTO: 44.3 % (ref 36–46)
HDLC SERPL-MCNC: 90.8 MG/DL
HGB BLD-MCNC: 15 G/DL (ref 12–16)
IMM GRANULOCYTES # BLD AUTO: 0.02 X10*3/UL (ref 0–0.7)
IMM GRANULOCYTES NFR BLD AUTO: 0.4 % (ref 0–0.9)
LDLC SERPL CALC-MCNC: 183 MG/DL
LYMPHOCYTES # BLD AUTO: 1.33 X10*3/UL (ref 1.2–4.8)
LYMPHOCYTES NFR BLD AUTO: 24.7 %
MCH RBC QN AUTO: 31.8 PG (ref 26–34)
MCHC RBC AUTO-ENTMCNC: 33.9 G/DL (ref 32–36)
MCV RBC AUTO: 94 FL (ref 80–100)
MONOCYTES # BLD AUTO: 0.31 X10*3/UL (ref 0.1–1)
MONOCYTES NFR BLD AUTO: 5.8 %
NEUTROPHILS # BLD AUTO: 3.63 X10*3/UL (ref 1.2–7.7)
NEUTROPHILS NFR BLD AUTO: 67.4 %
NON HDL CHOLESTEROL: 193 MG/DL (ref 0–149)
NRBC BLD-RTO: 0 /100 WBCS (ref 0–0)
PHOSPHATE SERPL-MCNC: 4.3 MG/DL (ref 2.5–4.9)
PLATELET # BLD AUTO: 220 X10*3/UL (ref 150–450)
POTASSIUM SERPL-SCNC: 5.1 MMOL/L (ref 3.5–5.3)
RBC # BLD AUTO: 4.71 X10*6/UL (ref 4–5.2)
SODIUM SERPL-SCNC: 142 MMOL/L (ref 136–145)
TRIGL SERPL-MCNC: 52 MG/DL (ref 0–149)
TSH SERPL-ACNC: 2.77 MIU/L (ref 0.44–3.98)
VLDL: 10 MG/DL (ref 0–40)
WBC # BLD AUTO: 5.4 X10*3/UL (ref 4.4–11.3)

## 2025-01-10 PROCEDURE — 81001 URINALYSIS AUTO W/SCOPE: CPT

## 2025-01-10 PROCEDURE — 84450 TRANSFERASE (AST) (SGOT): CPT

## 2025-01-10 PROCEDURE — 84443 ASSAY THYROID STIM HORMONE: CPT

## 2025-01-10 PROCEDURE — 84460 ALANINE AMINO (ALT) (SGPT): CPT

## 2025-01-10 PROCEDURE — 80069 RENAL FUNCTION PANEL: CPT

## 2025-01-10 PROCEDURE — 85025 COMPLETE CBC W/AUTO DIFF WBC: CPT

## 2025-01-10 PROCEDURE — 80061 LIPID PANEL: CPT

## 2025-01-10 NOTE — PATIENT INSTRUCTIONS
Overall you are in good health today, age and gender appropriate wellness services reviewed  You live a healthy lifestyle  You have no clinical evidence of heart disease, and cardiac risk factors are satisfactory with emphasis on healthy good HDL cholesterol  Repeat coronary artery calcium CT score 2024 without significant change since 2018; recheck fasting cholesterol levels  Age and gender appropriate cancer screening and prevention is up-to-date including Cologuard   Continue breast surveillance  Immunizations are up-to-date  Bone density near normal, stable   Knee arthritis manageable with meloxicam, take with food

## 2025-01-10 NOTE — PROGRESS NOTES
Subjective   Patient ID: Lashell Perkins is a 64 y.o. female who presents for No chief complaint on file..  HPI  Pain in knees,  hip pain meloxicam helps, not limiting   LBP   Hot flashes at night 4-5 months , disturbs sleep  Mohs surgery Derm   [facility]          xxxxxxxxxxxxx  Chronic postnasal drip, Zyrtec, nonseasonal  Knee pain brace Advil exercise worse on stairs, hands ache  Had flu and COVID-19 vaccines  Review of Systems    122# eye 2024 ?cataract, hearing ok , dentist ok   Cologuard neg   Constitutional: 122 #, but not feeling poorly, not feeling tired, no fever, no recent weight gain and no recent weight loss.   Eyes: exam UTD  glasses+ /contacts - 2021, but no blurred vision, no diplopia and no eyesight problems.   ENT: dental UTD+, but no hearing loss, no tinnitus, no earache, no sore throat, no hoarseness and no swollen glands in the neck.   Cardiovascular: no chest pain, no tightness or heavy pressure, no shortness of breath, no palpitations and no lower extremity edema.   Respiratory: no cough, not coughing up sputum and no wheezing that is consistent with asthma.   Gastrointestinal: colonoscope Dec 2014, but no change in bowel habits, no diarrhea, no constipation, no bloody stools, no nausea, no vomiting, no abdominal pain, no signs and symptoms of ulcer disease, no kingsley colored stools and no intolerance to fatty foods.   Genitourinary: no urinary frequency, no dysuria, no burning sensation during urination and no hematuria.   Musculoskeletal: arthralgias, joint stiffness and knees not limiting, but as noted in HPI, no myalgias, no joint swelling, no muscle weakness, no back pain, no limb pain, no limb swelling and no difficulty walking.   Skin: no rashes, no change in skin color and pigmentation, no skin lesions and no skin lumps.   Neurological: no headaches, no dizziness, no seizures, no tingling, no numbness, no signs and symptoms of stroke and no limb weakness.   Psychiatric: no confusion, no  memory lapses or loss, no depression, no anxiety and no sleep disturbances.   Endocrine: no goiter, no thyroid disorder, no diabetes mellitus, no excessive thirst, no dry skin, no cold intolerance, no heat intolerance and no increased urinary frequency.   Hematologic/Lymphatic: is not slow to heal, does not bleed easily, does not bruise easily, no thrombophlebitis, no anemia and no history of blood transfusion.      Objective   Physical Exam  There were no vitals taken for this visit.    General: well-developed, well-nourished middle-aged ambulatory white female in no acute distress  Head: normocephalic/atraumatic; temporal arteries intact, nontender; Temporalmandibular joints nontender without click  Eyes: EOMI, PERRLA, sclera white, conjunctiva pink, fundi not examined  Ears: Canals cerumen, TMs clear intact with normal landmarks, hearing intact to speech and finger rub  Nose: Nasal mucosa clear, pink, moist; no sinus tenderness  Oropharynx: Mucosa clear, pink, moist without lesions or exudate   Dentition intact good/ fair condition/repair   Tonsils atrophy/absent   Tongue midline, normal mucosa and protrusion   Normal motion of palate/uvula/pharynx  Neck: Supple, full range of motion; no lymphadenopathy; no JVD   Thyroid normal size, smooth to palpation/observation without nodules   Carotids normal pulse, upstroke without bruit  Back: Spine normal shape, curvature, flexibility, nontender   No CVA or SIJ tenderness  Chest: Normal symmetrical, full expansion with equal breath sounds without adventitious sounds; wall nontender  Breasts: Not examined  Cor: PMI normal; regular rate and rhythm; normal S1, S2 without adventitious sounds  Abdomen: Soft, nontender, benign without mass, hepatosplenomegaly, fluid   Bowels sounds present, normal  Pelvic: Not examined  Rectal: Not examined  Extremities: Bones, Joints intact with FROM, T0 L0 S0; mild crepitus bilateral knees right greater than left   Pulses intact,  symmetrical; no edema; venous system legs normal  Neurological: Mental status - alert, appropriate affect, normal orientation and conversational interaction; RIGHT handed dominant   Cranial Nerves: II-XII intact   Motor - 5/5 strength throughout muscle groups; normal tone, bulk   Sensory - intact symmetrical soft touch, sharp, sense   DTRs - intact knee, ankle, brachial   Cerebellar - normal finger-nose; absent Rhomberg sign   Station/gait - intact, stable including toe, heel, and tandem   Integument: Skin - clear without rash or lesion   Lymph - without cervical, axillary, or inguinal lymphadenopathy       Falls -    Results/Data    Dermatology  Orthopedics consult 4/4/2023  GYN consult 12/6/2023  Ophthalmology consult 1/8/2024    Laboratory  1/6/24  HDL c 82, R 3.2 LDLC 167  February 2018 satisfactory with healthy vitamin D and normal TSH  Electrocardiogram sinus bradycardia rate 56  Esophagram 2/6/23 -normal  colonoscopy January 2014  Mammogram January 2021 2/13/23  Knee x-ray March 2018 4/4/23  Bone densitometry June 2016 2021 stable, near normal  Chest x-ray May 2018  CT cardiac score July 2018 equals 46     Assessment/Plan   Overall you are in good health today  You have no clinical evidence of heart disease, and cardiac risk factors are satisfactory with emphasis on healthy good HDL cholesterol  Recommend repeat coronary artery calcium CT score to assess for any change since 2018  Age and gender appropriate cancer screening and prevention will be up-to-date with a colon check  Recommend Cologuard  Continue breast surveillance  Immunizations updated with tetanus diphtheria pertussis, good for 10 years; consider RSV vaccine  Rhinitis, nocturnal runny nose-May continue antihistamine for short-term relief, trial topical cortisone nasal spray for 2 to 3 weeks such as Nasacort and assess for clearing  Problem List Items Addressed This Visit    None               Aspartate Aminotransferase (Completed)    TSH with reflex to Free T4 if abnormal (Completed)    CBC and Auto Differential (Completed)    Urinalysis with Reflex Microscopic (Completed)    Hyperlipidemia

## 2025-01-11 LAB
APPEARANCE UR: CLEAR
BACTERIA #/AREA URNS AUTO: ABNORMAL /HPF
BILIRUB UR STRIP.AUTO-MCNC: NEGATIVE MG/DL
COLOR UR: COLORLESS
GLUCOSE UR STRIP.AUTO-MCNC: NORMAL MG/DL
KETONES UR STRIP.AUTO-MCNC: NEGATIVE MG/DL
LEUKOCYTE ESTERASE UR QL STRIP.AUTO: ABNORMAL
NITRITE UR QL STRIP.AUTO: NEGATIVE
PH UR STRIP.AUTO: 6.5 [PH]
PROT UR STRIP.AUTO-MCNC: NEGATIVE MG/DL
RBC # UR STRIP.AUTO: NEGATIVE /UL
RBC #/AREA URNS AUTO: ABNORMAL /HPF
SP GR UR STRIP.AUTO: 1
UROBILINOGEN UR STRIP.AUTO-MCNC: NORMAL MG/DL
WBC #/AREA URNS AUTO: ABNORMAL /HPF

## 2025-02-08 ENCOUNTER — PATIENT MESSAGE (OUTPATIENT)
Dept: PRIMARY CARE | Facility: CLINIC | Age: 65
End: 2025-02-08
Payer: COMMERCIAL

## 2025-02-08 DIAGNOSIS — E78.89 ELEVATED HDL: Primary | ICD-10-CM

## 2025-02-17 ENCOUNTER — APPOINTMENT (OUTPATIENT)
Dept: RADIOLOGY | Facility: HOSPITAL | Age: 65
End: 2025-02-17
Payer: COMMERCIAL

## 2025-02-17 ENCOUNTER — APPOINTMENT (OUTPATIENT)
Dept: SURGICAL ONCOLOGY | Facility: HOSPITAL | Age: 65
End: 2025-02-17
Payer: COMMERCIAL

## 2025-02-18 NOTE — PROGRESS NOTES
[facility]  Lashell Perkins female   1960 64 y.o.   [id number]      Chief Complaint  Follow up annual mammogram and exam, high risk surveillance.    History Of Present Illness  Lashell Perkins is a very pleasant 64 y.o.  female followed in the breast center for high risk surveillance care. She has a history of a bilateral breast reduction in her 20's. She also has history of bilateral benign core biopsies. She has had genetic testing and was negative. She completed 5 years of Raloxifene from 2017-2022. She has family history of breast cancer, see below. She presents today for annual mammogram and exam.     BREAST IMAGIN/15/2024 Bilateral screening mammogram, indicates BI-RADS Category 2.    REPRODUCTIVE HISTORY:  menarche age 13, , first birth age 35,  x 9 months, OCP's, natural menopause age 55, no HRT, heterogeneously dense    FAMILY CANCER HISTORY:   Paternal Grandmother: Breast cancer, 40's with mets  Paternal Aunt: Breast cancer, 30's (BRCA+)  Paternal Uncle: Breast cancer, age 79  Sister: NO CANCER, BRCA2+ (had prophylactic oophorectomy and bilateral mastectomy)  Brother: NO CANCER, BRCA2+    Review of Systems  Constitutional:  Negative for appetite change, fatigue, fever and unexpected weight change.   HENT:  Negative for ear pain, hearing loss, nosebleeds, sore throat and trouble swallowing.    Eyes:  Negative for discharge, itching and visual disturbance.   Breast: As stated in HPI.  Respiratory:  Negative for cough, chest tightness and shortness of breath.    Cardiovascular:  Negative for chest pain, palpitations and leg swelling.   Gastrointestinal:  Negative for abdominal pain, constipation, diarrhea and nausea.   Endocrine: Negative for cold intolerance and heat intolerance.   Genitourinary:  Negative for dysuria, frequency, hematuria, pelvic pain and vaginal bleeding.   Musculoskeletal:  Negative for arthralgias, back pain, gait problem, joint swelling and  myalgias.   Skin:  Negative for color change and rash.   Allergic/Immunologic: Negative for environmental allergies and food allergies.   Neurological:  Negative for dizziness, tremors, speech difficulty, weakness, numbness and headaches.   Hematological:  Does not bruise/bleed easily.   Psychiatric/Behavioral:  Negative for agitation, dysphoric mood and sleep disturbance. The patient is not nervous/anxious.            Past Medical History  She has a past medical history of Allergic, Fibrocystic breast (1981), Genital herpes (09/23/2023), Personal history of (healed) traumatic fracture (03/20/2016), Sialolithiasis (03/20/2016), and Unspecified donor, other blood (03/20/2016).    She has no past medical history of Personal history of irradiation.    Surgical History  She has a past surgical history that includes Mouth surgery (10/01/2014); Other surgical history (10/01/2014); Breast biopsy (10/01/2014); Breast surgery (11/17/2014); Mandible surgery; [place] tooth extraction; and Reduction mammaplasty (1985).     Family History  Cancer-related family history includes Breast cancer in her father's sister and paternal grandmother.     Social History  She reports that she has never smoked. She has never been exposed to tobacco smoke. She has never used smokeless tobacco. She reports that she does not currently use alcohol after a past usage of about 1.0 standard drink of alcohol per week. She reports that she does not use drugs.    Allergies  Ciprofloxacin, Moxifloxacin, Penicillins, and Sulfa (sulfonamide antibiotics)    Medications  Current Outpatient Medications   Medication Instructions    meloxicam (MOBIC) 15 mg, oral, Daily       Last Recorded Vitals  Blood pressure 134/74, pulse 67, temperature 36.7 °C (98.1 °F), temperature source Temporal, resp. rate 17, height 1.524 m (5'), weight 56.7 kg (125 lb), SpO2 95%.      Physical Exam  Chest:         Patient is alert and oriented x3 and in a relaxed and appropriate  mood. Her gait is steady and hand grasps are equal. Sclera is clear. The breasts are nearly symmetrical. Bilateral breasts have well healed pedicle method incisions. The tissue is soft without palpable abnormalities, discrete nodules or masses. The skin and nipples appear normal. There is no cervical, supraclavicular or axillary lymphadenopathy.     Relevant Results and Imaging  pending      Risk Models      Orders  Orders Placed This Encounter   Procedures    BI mammo bilateral screening tomosynthesis     Standing Status:   Future     Standing Expiration Date:   3/24/2026     Order Specific Question:   Reason for exam:     Answer:   annual screening mammogram     Order Specific Question:   Radiologist to Determine Optimal Study     Answer:   Yes     Order Specific Question:   Release result to [employer]     Answer:   Immediate     Order Specific Question:   Is this exam part of a Research Study? If Yes, link this order to the research study     Answer:   No    BI US breast complete bilateral     Standing Status:   Future     Standing Expiration Date:   4/24/2026     Order Specific Question:   Reason for exam:     Answer:   breast cancer high risk screening     Order Specific Question:   Radiologist to Determine Optimal Study     Answer:   Yes     Order Specific Question:   Release result to [employer]     Answer:   Immediate     Order Specific Question:   Is this exam part of a Research Study? If Yes, link this order to the research study     Answer:   No       Visit Diagnosis  1. Breast cancer screening, high risk patient  Clinic Appointment Request Follow Up    Clinic Appointment Request Follow Up    BI US breast complete bilateral      2. BRCA negative        3. Dense breast tissue  BI US breast complete bilateral      4. Breast cancer screening by mammogram  BI mammo bilateral screening tomosynthesis          Assessment/Plan  High risk surveillance care, normal clinical exam, history bilateral breast reduction,  history bilateral benign core biopsies, BRCA negative, completed 5 years of Raloxifene, family history of breast cancer, heterogeneously dense    Plan:  Return February 2026 for bilateral screening mammogram and office visit. Proceed with bilateral breast ultrasound August 2025. Completed 5 years of Raloxifene.    Patient Discussion/Summary  Your clinical examination is normal. You had a screening mammogram today and the results are pending. I will inform you if the screening mammogram is abnormal. Please return in one year for a screening mammogram and office visit or sooner if you have any questions or concerns.       High risk breast surveillance care plan:  Yearly mammogram with digital breast tomosynthesis  Twice yearly clinical breast examinations  Breast MRI (to schedule call [phone])  Monthly self breast examinations &/or regular self breast awareness  Vitamin D3 2000 IU/daily (over the counter) unless your PCP recommends you take a specific dose  Exercise 3-4 times per week for 45-60 minutes  Limit alcohol to 3-4 drinks per week if you are menopausal  Eat healthy low-fat diet with lots of vegetable & fruits  You have completed 5 years of Raloxifene. Endocrine therapy reduces lifetime risk of breast cancer by up to 50% when taken for 5 years.     IMPORTANT INFORMATION REGARDING YOUR RESULTS    You can see your health information, review clinical summaries from office visits & test results online when you follow your health with MY  Chart, a personal health record. To sign up go to [email or web address]. If you need assistance with signing up or trouble getting into your account call [employer] Patient Line 24/7 at [phone].    My office phone number is [phone] if you need to get in touch with me or have additional questions or concerns. Thank you for choosing [facility] and trusting me as your healthcare provider. I look forward to seeing you again at your next office visit. I am  honored to be a provider on your health care team and I remain dedicated to helping you achieve your health goals.    Lanette Vasquez, APRN-CNP

## 2025-02-24 ENCOUNTER — HOSPITAL ENCOUNTER (OUTPATIENT)
Dept: RADIOLOGY | Facility: HOSPITAL | Age: 65
Discharge: HOME | End: 2025-02-24
Payer: COMMERCIAL

## 2025-02-24 ENCOUNTER — OFFICE VISIT (OUTPATIENT)
Dept: SURGICAL ONCOLOGY | Facility: HOSPITAL | Age: 65
End: 2025-02-24
Payer: COMMERCIAL

## 2025-02-24 VITALS
TEMPERATURE: 98.1 F | BODY MASS INDEX: 24.54 KG/M2 | HEART RATE: 67 BPM | RESPIRATION RATE: 17 BRPM | OXYGEN SATURATION: 95 % | HEIGHT: 60 IN | DIASTOLIC BLOOD PRESSURE: 74 MMHG | WEIGHT: 125 LBS | SYSTOLIC BLOOD PRESSURE: 134 MMHG

## 2025-02-24 VITALS — WEIGHT: 124 LBS | BODY MASS INDEX: 24.35 KG/M2 | HEIGHT: 60 IN

## 2025-02-24 DIAGNOSIS — Z12.39 BREAST CANCER SCREENING, HIGH RISK PATIENT: Primary | ICD-10-CM

## 2025-02-24 DIAGNOSIS — R92.30 DENSE BREAST TISSUE: ICD-10-CM

## 2025-02-24 DIAGNOSIS — Z13.71 BRCA NEGATIVE: ICD-10-CM

## 2025-02-24 DIAGNOSIS — Z12.31 BREAST CANCER SCREENING BY MAMMOGRAM: ICD-10-CM

## 2025-02-24 PROCEDURE — 77063 BREAST TOMOSYNTHESIS BI: CPT | Performed by: RADIOLOGY

## 2025-02-24 PROCEDURE — 99214 OFFICE O/P EST MOD 30 MIN: CPT | Performed by: NURSE PRACTITIONER

## 2025-02-24 PROCEDURE — 77067 SCR MAMMO BI INCL CAD: CPT | Performed by: RADIOLOGY

## 2025-02-24 PROCEDURE — 1036F TOBACCO NON-USER: CPT | Performed by: NURSE PRACTITIONER

## 2025-02-24 PROCEDURE — 3008F BODY MASS INDEX DOCD: CPT | Performed by: NURSE PRACTITIONER

## 2025-02-24 PROCEDURE — 77067 SCR MAMMO BI INCL CAD: CPT

## 2025-02-24 ASSESSMENT — PAIN SCALES - GENERAL: PAINLEVEL_OUTOF10: 0-NO PAIN

## 2025-02-27 ENCOUNTER — APPOINTMENT (OUTPATIENT)
Dept: PHYSICAL THERAPY | Facility: CLINIC | Age: 65
End: 2025-02-27
Payer: COMMERCIAL

## 2025-03-15 PROBLEM — N76.0 VAGINITIS: Status: RESOLVED | Noted: 2023-09-23 | Resolved: 2025-03-15

## 2025-03-15 PROBLEM — R10.13 EPIGASTRIC BURNING SENSATION: Status: RESOLVED | Noted: 2023-09-23 | Resolved: 2025-03-15

## 2025-03-15 PROBLEM — H04.123 DRY EYES, BILATERAL: Status: ACTIVE | Noted: 2024-01-08

## 2025-03-15 PROBLEM — K21.9 GASTROESOPHAGEAL REFLUX DISEASE: Status: RESOLVED | Noted: 2023-09-23 | Resolved: 2025-03-15

## 2025-03-15 ASSESSMENT — ENCOUNTER SYMPTOMS
BRUISES/BLEEDS EASILY: 0
VOICE CHANGE: 0
NAUSEA: 0
EYES NEGATIVE: 1
CONSTIPATION: 0
WOUND: 0
CONFUSION: 0
ENDOCRINE NEGATIVE: 1
LIGHT-HEADEDNESS: 0
POLYDIPSIA: 0
UNEXPECTED WEIGHT CHANGE: 0
POLYPHAGIA: 0
DIARRHEA: 0
DYSPHORIC MOOD: 0
TREMORS: 0
VOMITING: 0
DECREASED CONCENTRATION: 0
MUSCULOSKELETAL NEGATIVE: 1
ABDOMINAL PAIN: 0
SLEEP DISTURBANCE: 0
HEADACHES: 0
FATIGUE: 0
HEMATURIA: 0
SPEECH DIFFICULTY: 0
NUMBNESS: 0
NERVOUS/ANXIOUS: 0
DYSURIA: 0
RESPIRATORY NEGATIVE: 1
BACK PAIN: 0
FEVER: 0
PALPITATIONS: 0
TROUBLE SWALLOWING: 0
FREQUENCY: 0
DIZZINESS: 0
ARTHRALGIAS: 0
COUGH: 0
APNEA: 0
SHORTNESS OF BREATH: 0
SORE THROAT: 0
WEAKNESS: 0

## 2025-03-23 DIAGNOSIS — M25.551 RIGHT HIP PAIN: ICD-10-CM

## 2025-03-24 RX ORDER — MELOXICAM 15 MG/1
15 TABLET ORAL DAILY
Qty: 30 TABLET | Refills: 11 | Status: SHIPPED | OUTPATIENT
Start: 2025-03-24

## 2025-03-28 ENCOUNTER — EVALUATION (OUTPATIENT)
Dept: PHYSICAL THERAPY | Facility: CLINIC | Age: 65
End: 2025-03-28
Payer: COMMERCIAL

## 2025-03-28 DIAGNOSIS — M25.562 CHRONIC PAIN OF BOTH KNEES: Primary | ICD-10-CM

## 2025-03-28 DIAGNOSIS — M17.11 PATELLOFEMORAL ARTHRITIS OF RIGHT KNEE: ICD-10-CM

## 2025-03-28 DIAGNOSIS — G89.29 CHRONIC PAIN OF BOTH KNEES: Primary | ICD-10-CM

## 2025-03-28 DIAGNOSIS — M25.561 CHRONIC PAIN OF BOTH KNEES: Primary | ICD-10-CM

## 2025-03-28 PROCEDURE — 97161 PT EVAL LOW COMPLEX 20 MIN: CPT | Mod: GP

## 2025-03-28 PROCEDURE — 97110 THERAPEUTIC EXERCISES: CPT | Mod: GP

## 2025-03-28 NOTE — PROGRESS NOTES
Physical Therapy Evaluation    Patient Name: Lashell Perkins  MRN: [id number]  Today's Date: 3/28/25  Time Calculation  Start Time: 0818  Stop Time: 0905  Time Calculation (min): 47 min  PT Evaluation Time Entry  PT Evaluation (Low) Time Entry: 20  PT Therapeutic Procedures Time Entry  Manual Therapy Time Entry: 5  Therapeutic Exercise Time Entry: 18        Insurance: 5to1  Plan of Care: 3/28/25 to 6/26/25  Visit #1    Referring MD Joaquin Shah  Imaging Performed xray FINDINGS:  Moderate osteoarthritis bilateral knees worst laterally.    Assessment     Lashell Perkins is a 65 y.o. referred for B knee pain. Patient demonstrates mild decreased R knee flexion AROM, decreased strength in B hips and thighs, tenderness in R knee, and pain. At this time, patient is limited with walking long distances, going down steps, doing certain exercises/workouts. Patient will benefit from physical therapy services to address stated impairments and improve functional mobility.    Plan  Treatment/Interventions: Cryotherapy, Education/ Instruction, Gait training, Hot pack, Manual therapy, Neuromuscular re-education, Self care/ home management, Therapeutic activities, Therapeutic exercises  PT Plan: Skilled PT  PT Frequency: 1 time per week  Duration: 6 visits  Onset Date: 01/01/15  Certification Period Start Date: 03/28/25  Certification Period End Date: 06/26/25  Number of Treatments Authorized: [id number]  Rehab Potential: Good  Plan of Care Agreement: Patient    Primary diagnosis:   Current Problem  1. Chronic pain of both knees  Referral to Physical Therapy    Follow Up In Physical Therapy      2. Patellofemoral arthritis of right knee  Referral to Physical Therapy    Follow Up In Physical Therapy          General:  General  Reason for Referral: B knee pain  Referred By: joaquin shah  Past Medical History Relevant to Rehab: injection in R hip  Preferred Learning Style: verbal, visual, written  Precautions:  Precautions  STEADI Fall Risk Score (The  score of 4 or more indicates an increased risk of falling): 0  Precautions Comment: none  Vital Signs:       Subjective:  Chief complaints: R > L knee pain  Onset/Surgery Date: 10 years   Mechanism of Injury: insidious onset  Previous History: R hip pain/injection  Personal Factors that may impact care: none     Pain:  Current: 0/10 Best: 0/10 Worst: 2/10 on meloxicam, 8/10 off meloxicam   Location: Anterior R > L  knee   Type: ache   Aggravators: down steps, straightening the knee during exercises, walking longer distances   Alleviators: Meloxicam (but has been bothering her stomach)   Numbness/tingling? no    Function:   Work/Recreation: non profit exec   Prior Level: Limited somewhat by knee pain for a few years   Current limitations: down steps, straightening the knee, walking longer distances   Condition: Worsening     Home Situation:    Stairs: yes   No concerns about home set up    Any falls in the past year No     Injuries? N/a    Fear of falling? No    Sleep:    Getting to sleep Yes   Disturbed No   Preferred position(s):      Goals for Therapy:    Strengthen, reduce pain    Objective   Palpation: Tender R medial knee; more crepitus on L than R     ROM/Flexibility:    Knee Flexion R / L :      133, sore ant/lat / 137    Knee Extension R / L :  1 / 3        Hamstring R / L : 90 / 90     Girth Measurements/Swelling :    Mid patellar R / L : 34.5 cm / 34.5 cm     Strength R / L :    Hip Flexion:     4+, sore / 4+, sore    Hip Extension:     5 / 5     Hip Abduction:    4+ / 5    Hip Adduction:    5 / 5    Hip ER:      4, pain / 5    Knee Extension:   5 / 4+, pain     Knee Flexion:       4+, pain / 5    Ankle DF:             5 / 5    Ankle PF:              5 / 5     Neurological: Pt endorses intact/symmetrical BLE sensation     Gait: Normal appearing gait     Balance: SLS 30 sec R/L     Special Tests R / L :     Anterior Drawer:    - / -    Lachman:               - / -     Posterior Drawer:   - / -     Posterior  Sag:         - / -    Varus @ 0` :            - / -    Varus @ 30` :          - / -    Valgus @ 0` :          - / -    Valgus @ 30` :        - / -     Zeyad:             - / -      Outcome Measure:    LEFS : 62 / 80      Treatment:  Therapeutic Exercise  SLR x 10  SLR in ER x 10  Fig 4 bridge x 10  Sidelying clam x 10, told to add band for HEP  Heel tap x 10 ea   Lat amb x 1 lap, ~20 feet    Goals:  Active       PT Problem       Pt will increase R knee flexion ROM to 135 to facilitate improved mobility for ADLs.        Start:  04/01/25    Expected End:  05/01/25            Pt will be able to perform 10 heel taps from 6 inch step to indicate improving thigh and hip strength for improved negotiation of steps.        Start:  04/01/25    Expected End:  05/01/25            Pt will increase strength to 5/5 in weakened BLE muscles to improve tolerance for workouts, longer walks, and going down steps.        Start:  04/01/25    Expected End:  05/31/25            Patient will be independent with home exercise program for home maintenance.        Start:  04/01/25    Expected End:  05/31/25

## 2025-04-01 ASSESSMENT — ENCOUNTER SYMPTOMS
DEPRESSION: 0
LOSS OF SENSATION IN FEET: 0
OCCASIONAL FEELINGS OF UNSTEADINESS: 0

## 2025-04-21 ENCOUNTER — TREATMENT (OUTPATIENT)
Dept: PHYSICAL THERAPY | Facility: CLINIC | Age: 65
End: 2025-04-21
Payer: COMMERCIAL

## 2025-04-21 DIAGNOSIS — M25.562 CHRONIC PAIN OF BOTH KNEES: ICD-10-CM

## 2025-04-21 DIAGNOSIS — M17.11 PATELLOFEMORAL ARTHRITIS OF RIGHT KNEE: ICD-10-CM

## 2025-04-21 DIAGNOSIS — G89.29 CHRONIC PAIN OF BOTH KNEES: ICD-10-CM

## 2025-04-21 DIAGNOSIS — M25.561 CHRONIC PAIN OF BOTH KNEES: ICD-10-CM

## 2025-04-21 PROCEDURE — 97110 THERAPEUTIC EXERCISES: CPT | Mod: GP

## 2025-04-21 NOTE — PROGRESS NOTES
Physical Therapy    Physical Therapy Treatment    Patient Name: Lashlel Perkins  MRN: [id number]  Today's Date: 4/21/2025    Time Entry:   Time Calculation  Start Time: 0950  Stop Time: 1030  Time Calculation (min): 40 min     PT Therapeutic Procedures Time Entry  Therapeutic Exercise Time Entry: 40                 Visit Count: 2    Assessment:   Pain with depth of squat, even on shuttle. Able to isolate quads with ccamb, TKE, and heel tap without creating knee pain. Just noted cracking/crepitus in R knee w/heel tap.     Plan:   Try ccamb lateral, RDL, standing clam; check strength and eliminate table ex's if able    Current Problem  1. Chronic pain of both knees  Follow Up In Physical Therapy      2. Patellofemoral arthritis of right knee  Follow Up In Physical Therapy          Subjective    Lot of stairs and hills while on vacation in [place]  Meloxicam still providing a lot of relief, but trying not to take it.   Precautions   None  Pain   0/10 R knee at rest    Objective   R knee flexion 130    Treatments:  Therapeutic Exercise  SLR 1# x 10 R  SLR in ER 1# x 10 R  Fig 4 bridge 2 x 10   Sidelying clam 3 sec 2 x 10, red  Shuttle 2B squat x 10 - pain in B knees during eccentric motion  Ccamb 6 plates x 5 retro and forward  TKE black TB 5 sec x 15-20 ea side  Heel tap 2 x 10 ea // Leaning resisted march 2 x 20 alt (RTB)      Goals:  Active       PT Problem       Pt will increase R knee flexion ROM to 135 to facilitate improved mobility for ADLs.        Start:  04/01/25    Expected End:  05/01/25            Pt will be able to perform 10 heel taps from 6 inch step to indicate improving thigh and hip strength for improved negotiation of steps.        Start:  04/01/25    Expected End:  05/01/25            Pt will increase strength to 5/5 in weakened BLE muscles to improve tolerance for workouts, longer walks, and going down steps.        Start:  04/01/25    Expected End:  05/31/25            Patient will be  independent with home exercise program for home maintenance.        Start:  04/01/25    Expected End:  05/31/25

## 2025-05-12 ENCOUNTER — TREATMENT (OUTPATIENT)
Dept: PHYSICAL THERAPY | Facility: CLINIC | Age: 65
End: 2025-05-12
Payer: COMMERCIAL

## 2025-05-12 DIAGNOSIS — M25.562 CHRONIC PAIN OF BOTH KNEES: ICD-10-CM

## 2025-05-12 DIAGNOSIS — G89.29 CHRONIC PAIN OF BOTH KNEES: ICD-10-CM

## 2025-05-12 DIAGNOSIS — M25.561 CHRONIC PAIN OF BOTH KNEES: ICD-10-CM

## 2025-05-12 DIAGNOSIS — E78.89 ELEVATED HDL: ICD-10-CM

## 2025-05-12 DIAGNOSIS — M17.11 PATELLOFEMORAL ARTHRITIS OF RIGHT KNEE: ICD-10-CM

## 2025-05-12 PROCEDURE — 97110 THERAPEUTIC EXERCISES: CPT | Mod: GP

## 2025-05-12 NOTE — PROGRESS NOTES
Physical Therapy    Physical Therapy Treatment    Patient Name: Lashell Perkins  MRN: [id number]  Today's Date: 5/12/2025    Time Entry:   Time Calculation  Start Time: 0924  Stop Time: 0955  Time Calculation (min): 31 min     PT Therapeutic Procedures Time Entry  Therapeutic Exercise Time Entry: 30                 Visit Count: 3     Assessment:   Able to do mini squat at rail with <3/10 pain in knee. Mechanical clicking/popping with heel tap today outside of a few reps. Pt comfortable with updated exercises and continuing with other workouts. Discussed the activities that would likely irritate her issue and provided further info if she would like to get second opinion about TKA.     Plan:   Try ccamb lateral, RDL, standing clam; check strength and eliminate table ex's if able    Current Problem  1. Chronic pain of both knees  Follow Up In Physical Therapy      2. Patellofemoral arthritis of right knee  Follow Up In Physical Therapy          Subjective    Taking 1/2 meloxicams every day for the last 1.5 weeks. That helps more than anything.  Precautions   None  Pain   0/10 R knee at rest    Objective   R knee flexion 130    Treatments:  Therapeutic Exercise   Ccamb 6 plates x 5 retro and forward  TKE black TB 5 sec x 15-20 ea side  Heel tap 2 x 10 ea // Leaning resisted march x 20 ea (RTB) // Standing clam 2 x 10 ea (RTB)  Mini squat at rail x 10        Goals:  Active       PT Problem       Pt will increase R knee flexion ROM to 135 to facilitate improved mobility for ADLs.        Start:  04/01/25    Expected End:  05/01/25            Pt will be able to perform 10 heel taps from 6 inch step to indicate improving thigh and hip strength for improved negotiation of steps.        Start:  04/01/25    Expected End:  05/01/25            Pt will increase strength to 5/5 in weakened BLE muscles to improve tolerance for workouts, longer walks, and going down steps.        Start:  04/01/25    Expected End:  05/31/25             Patient will be independent with home exercise program for home maintenance.        Start:  04/01/25    Expected End:  05/31/25

## 2025-08-25 ENCOUNTER — HOSPITAL ENCOUNTER (OUTPATIENT)
Dept: RADIOLOGY | Facility: HOSPITAL | Age: 65
Discharge: HOME | End: 2025-08-25
Payer: COMMERCIAL

## 2025-08-25 DIAGNOSIS — R92.30 DENSE BREAST TISSUE: ICD-10-CM

## 2025-08-25 DIAGNOSIS — Z12.39 BREAST CANCER SCREENING, HIGH RISK PATIENT: ICD-10-CM

## 2025-08-25 PROCEDURE — 76983 USE EA ADDL TARGET LESION: CPT | Mod: 50

## 2025-08-25 PROCEDURE — 76642 ULTRASOUND BREAST LIMITED: CPT | Mod: BILATERAL PROCEDURE | Performed by: STUDENT IN AN ORGANIZED HEALTH CARE EDUCATION/TRAINING PROGRAM

## 2025-08-25 PROCEDURE — 76641 ULTRASOUND BREAST COMPLETE: CPT | Mod: 50

## 2026-01-19 ENCOUNTER — APPOINTMENT (OUTPATIENT)
Dept: PRIMARY CARE | Facility: CLINIC | Age: 66
End: 2026-01-19
Payer: COMMERCIAL